# Patient Record
Sex: MALE | Race: WHITE | NOT HISPANIC OR LATINO | Employment: FULL TIME | URBAN - METROPOLITAN AREA
[De-identification: names, ages, dates, MRNs, and addresses within clinical notes are randomized per-mention and may not be internally consistent; named-entity substitution may affect disease eponyms.]

---

## 2023-09-06 ENCOUNTER — MEDICAL CORRESPONDENCE (OUTPATIENT)
Dept: HEALTH INFORMATION MANAGEMENT | Facility: CLINIC | Age: 43
End: 2023-09-06

## 2023-09-06 ENCOUNTER — TRANSCRIBE ORDERS (OUTPATIENT)
Dept: OTHER | Age: 43
End: 2023-09-06

## 2023-09-06 DIAGNOSIS — M25.511 RIGHT SHOULDER PAIN, UNSPECIFIED CHRONICITY: Primary | ICD-10-CM

## 2023-09-13 NOTE — TELEPHONE ENCOUNTER
Action    Action Taken Per patient, had some kind of surgery more than 10-15 years ago in [place], [place] on RT shoulder. Not a replacement, but a repair of something. Not sure where or when exactly. Not sure what, just knows he was opened up. Nothing in [place]. Has not been seen since. States over a month ago, pain started. Has trouble sleeping and getting comfortable.        DIAGNOSIS: Right shoulder pain   APPOINTMENT DATE: 09/14/2023   NOTES STATUS DETAILS   OFFICE NOTE from referring provider N/A    OFFICE NOTE from other specialist N/A    DISCHARGE SUMMARY from hospital N/A    DISCHARGE REPORT from the ER N/A    OPERATIVE REPORT N/A    MEDICATION LIST N/A    EMG (for Spine) N/A    IMPLANT RECORD/STICKER N/A    LABS     CBC/DIFF N/A    CULTURES N/A    INJECTIONS DONE IN RADIOLOGY N/A    MRI N/A    CT SCAN N/A    XRAYS (IMAGES & REPORTS) N/A    TUMOR     PATHOLOGY  Slides & report N/A

## 2023-09-14 ENCOUNTER — PRE VISIT (OUTPATIENT)
Dept: ORTHOPEDICS | Facility: CLINIC | Age: 43
End: 2023-09-14
Payer: COMMERCIAL

## 2023-09-14 ENCOUNTER — ANCILLARY PROCEDURE (OUTPATIENT)
Dept: GENERAL RADIOLOGY | Facility: CLINIC | Age: 43
End: 2023-09-14
Attending: ORTHOPAEDIC SURGERY
Payer: COMMERCIAL

## 2023-09-14 ENCOUNTER — OFFICE VISIT (OUTPATIENT)
Dept: ORTHOPEDICS | Facility: CLINIC | Age: 43
End: 2023-09-14
Payer: COMMERCIAL

## 2023-09-14 DIAGNOSIS — M25.511 RIGHT SHOULDER PAIN, UNSPECIFIED CHRONICITY: ICD-10-CM

## 2023-09-14 DIAGNOSIS — M25.511 RIGHT SHOULDER PAIN: Primary | ICD-10-CM

## 2023-09-14 DIAGNOSIS — M19.011 ARTHRITIS OF RIGHT GLENOHUMERAL JOINT: Primary | ICD-10-CM

## 2023-09-14 DIAGNOSIS — M25.511 RIGHT SHOULDER PAIN: ICD-10-CM

## 2023-09-14 PROCEDURE — 73030 X-RAY EXAM OF SHOULDER: CPT | Mod: RT | Performed by: RADIOLOGY

## 2023-09-14 PROCEDURE — 99204 OFFICE O/P NEW MOD 45 MIN: CPT | Performed by: ORTHOPAEDIC SURGERY

## 2023-09-14 RX ORDER — ATOMOXETINE 100 MG/1
1 CAPSULE ORAL DAILY
COMMUNITY
Start: 2022-10-27 | End: 2023-12-12

## 2023-09-14 RX ORDER — VITAMIN B COMPLEX
1000 TABLET ORAL
COMMUNITY
Start: 2023-09-06

## 2023-09-14 RX ORDER — LISINOPRIL/HYDROCHLOROTHIAZIDE 10-12.5 MG
1 TABLET ORAL EVERY MORNING
COMMUNITY
Start: 2021-11-01

## 2023-09-14 RX ORDER — LANOLIN ALCOHOL/MO/W.PET/CERES
1000 CREAM (GRAM) TOPICAL
COMMUNITY
Start: 2023-07-03

## 2023-09-14 RX ORDER — DEXTROAMPHETAMINE SACCHARATE, AMPHETAMINE ASPARTATE MONOHYDRATE, DEXTROAMPHETAMINE SULFATE AND AMPHETAMINE SULFATE 7.5; 7.5; 7.5; 7.5 MG/1; MG/1; MG/1; MG/1
60 CAPSULE, EXTENDED RELEASE ORAL
COMMUNITY
Start: 2023-08-02 | End: 2023-10-13

## 2023-09-14 NOTE — PROGRESS NOTES
CHIEF COMPLAINT: Right shoulder pain    DIAGNOSIS: Right shoulder osteoarthritis     OCCUPATION/SPORT: [employer]     HPI:   Jairo Brown is a very pleasant 42 year old, Right-hand dominant male who presents for evaluation of right shoulder pain.  Symptoms started in August of 2023. There was no a precipitating event. The pain is located to the lateral portion of right shoulder. Worst pain is rated a 7-8 of 10, and current pain is rated at 5-6 of 10. Symptoms are worsened by sleeping, raising arm up, and driving. Symptoms are improved with massage. Patient has tried aleve and massage with mild relief. Patient has no radiating down the arm, notices some numbness. Notably, the patient has had right shoulder RC repair 20+ years ago and left shoulder hemiarthroplasty 2 years ago with Dr. Mitchell at [place]. No other concerns or complaints at this time.  SANE score R - 100 with pain L - 100    PAST MEDICAL HISTORY:  No past medical history on file.    PAST SURGICAL HISTORY:  No past surgical history on file.    CURRENT MEDICATIONS:  No current outpatient medications on file.       ALLERGIES:    Not on File      FAMILY HISTORY: No pertinent family history, reviewed in EMR.    SOCIAL HISTORY:   Social History     Socioeconomic History    Marital status:      Spouse name: Not on file    Number of children: Not on file    Years of education: Not on file    Highest education level: Not on file   Occupational History    Not on file   Tobacco Use    Smoking status: Not on file    Smokeless tobacco: Not on file   Substance and Sexual Activity    Alcohol use: Not on file    Drug use: Not on file    Sexual activity: Not on file   Other Topics Concern    Not on file   Social History Narrative    Not on file     Social Determinants of Health     Financial Resource Strain: Not on file   Food Insecurity: Not on file   Transportation Needs: Not on file   Physical Activity: Not on file   Stress: Not on file   Social  Connections: Not on file   Intimate Partner Violence: Not on file   Housing Stability: Not on file       REVIEW OF SYSTEMS: Positive for that noted in past medical history and history of present illness and otherwise reviewed in EMR    PHYSICAL EXAM:  Patient is Data Unavailable and weighs 0 lbs 0 oz There were no vitals taken for this visit.  There is no height or weight on file to calculate BMI.   Constitutional: Well-developed, well-nourished, healthy appearing male.  Skin: Warm, dry   HEENT: Normal  Cardiac: Well perfused extremities, strong 2+ peripheral pulses. No edema.   Pulmonary: Breathing room air    Musculoskeletal:   Right Shoulder:  Prior anterior based incision is healed  AROM right shoulder: 160/160/60/L1   AROM left shoulder: 160/160/60/L1   5-/5 supraspinatus, 5-/5 infraspinatus, 5/5 subscapularis  no AC joint pain, negative cross body adduction  Neurovascular exam and cervical spine exam are normal.    X-RAYS:   I personally reviewed the prior x-rays which demonstrate 2 metal anchors from prior rotator cuff repair, there is multiple loose bodies including subcoracoid space, there is significant glenohumeral joint space narrowing, a B2 glenoid with posterior glenoid bone loss.    ADVANCED IMAGING:     IMPRESSION: 42 year old-year-old right hand dominant male, with right shoulder osteoarthritis.     PLAN:     I discussed with the patient the etiology of their condition. We discussed at length the options as noted above.  We do lengthy discussion today.  The patient has a very complex medical problem including osteoarthritis of the right shoulder with multiple loose bodies, and the history of a open rotator cuff repair 20 years ago.  Patient already has some significant glenoid wear posteriorly.  We talked about options of conservative versus surgical intervention.  Conservatively we discussed activity modification, avoidance of shoulder loading exercises, anti-inflammatories as needed, consideration  of ultrasound-guided cortisone injection to the glenohumeral joint.  We discussed that surgically the patient would need to consider a joint replacement, we discussed that a hemiarthroplasty would continue to wear on the glenoid and especially given the posterior glenoid bone loss may lead to the point that the glenoid is not reconstructable.  Additionally we discussed the need to assess the rotator cuff given the history of the old rotator cuff repair, potential for a total versus reverse shoulder arthroplasty.  We discussed that a reverse shoulder arthroplasty would have a high chance of failure given the patient's young age and demanding job.  We discussed that the patient is going to be very high risk for failure of any arthroplasty especially given that the age of 42 then likely need for revisions in the patient's lifetime and that at some point the patient may have an irreconstructable shoulder.  Patient notes a fear of needles and a high pain tolerance and does not wish to try cortisone injections.  He is more interested in arthroplasty given a hemiarthroplasty on the left shoulder as well as a total knee replacement.  I again reemphasized the high chances of need for revision in the patient's lifetime, discussed the potential for significant degeneration, discussed conservative measures given the patient has not tried any so far.  Patient is going to consider this.  We will follow-up with [facility] for cortisone injection if interested.    At the conclusion of the office visit, Jairo verbally acknowledged that I answered all of his questions satisfactorily.    Zumla Davis MD  Orthopedic Surgery Sports Medicine and Shoulder Surgery

## 2023-09-14 NOTE — LETTER
9/14/2023         RE: Jairo Brown  [address]  [place] [address]        Dear Colleague,    Thank you for referring your patient, Jairo Brown, to the [facility]. Please see a copy of my visit note below.    CHIEF COMPLAINT: Right shoulder pain    DIAGNOSIS: Right shoulder osteoarthritis     OCCUPATION/SPORT: [employer]     HPI:   Jairo Brown is a very pleasant 42 year old, Right-hand dominant male who presents for evaluation of right shoulder pain.  Symptoms started in August of 2023. There was no a precipitating event. The pain is located to the lateral portion of right shoulder. Worst pain is rated a 7-8 of 10, and current pain is rated at 5-6 of 10. Symptoms are worsened by sleeping, raising arm up, and driving. Symptoms are improved with massage. Patient has tried aleve and massage with mild relief. Patient has no radiating down the arm, notices some numbness. Notably, the patient has had right shoulder RC repair 20+ years ago and left shoulder hemiarthroplasty 2 years ago with Dr. Mitchell at [place]. No other concerns or complaints at this time.  SANE score R - 100 with pain L - 100    PAST MEDICAL HISTORY:  No past medical history on file.    PAST SURGICAL HISTORY:  No past surgical history on file.    CURRENT MEDICATIONS:  No current outpatient medications on file.       ALLERGIES:    Not on File      FAMILY HISTORY: No pertinent family history, reviewed in EMR.    SOCIAL HISTORY:   Social History     Socioeconomic History     Marital status:      Spouse name: Not on file     Number of children: Not on file     Years of education: Not on file     Highest education level: Not on file   Occupational History     Not on file   Tobacco Use     Smoking status: Not on file     Smokeless tobacco: Not on file   Substance and Sexual Activity     Alcohol use: Not on file     Drug use: Not on file     Sexual activity: Not on file   Other  Topics Concern     Not on file   Social History Narrative     Not on file     Social Determinants of Health     Financial Resource Strain: Not on file   Food Insecurity: Not on file   Transportation Needs: Not on file   Physical Activity: Not on file   Stress: Not on file   Social Connections: Not on file   Intimate Partner Violence: Not on file   Housing Stability: Not on file       REVIEW OF SYSTEMS: Positive for that noted in past medical history and history of present illness and otherwise reviewed in EMR    PHYSICAL EXAM:  Patient is Data Unavailable and weighs 0 lbs 0 oz There were no vitals taken for this visit.  There is no height or weight on file to calculate BMI.   Constitutional: Well-developed, well-nourished, healthy appearing male.  Skin: Warm, dry   HEENT: Normal  Cardiac: Well perfused extremities, strong 2+ peripheral pulses. No edema.   Pulmonary: Breathing room air    Musculoskeletal:   Right Shoulder:  Prior anterior based incision is healed  AROM right shoulder: 160/160/60/L1   AROM left shoulder: 160/160/60/L1   5-/5 supraspinatus, 5-/5 infraspinatus, 5/5 subscapularis  no AC joint pain, negative cross body adduction  Neurovascular exam and cervical spine exam are normal.    X-RAYS:   I personally reviewed the prior x-rays which demonstrate 2 metal anchors from prior rotator cuff repair, there is multiple loose bodies including subcoracoid space, there is significant glenohumeral joint space narrowing, a B2 glenoid with posterior glenoid bone loss.    ADVANCED IMAGING:     IMPRESSION: 42 year old-year-old right hand dominant male, with right shoulder osteoarthritis.     PLAN:     I discussed with the patient the etiology of their condition. We discussed at length the options as noted above.  We do lengthy discussion today.  The patient has a very complex medical problem including osteoarthritis of the right shoulder with multiple loose bodies, and the history of a open rotator cuff repair 20  years ago.  Patient already has some significant glenoid wear posteriorly.  We talked about options of conservative versus surgical intervention.  Conservatively we discussed activity modification, avoidance of shoulder loading exercises, anti-inflammatories as needed, consideration of ultrasound-guided cortisone injection to the glenohumeral joint.  We discussed that surgically the patient would need to consider a joint replacement, we discussed that a hemiarthroplasty would continue to wear on the glenoid and especially given the posterior glenoid bone loss may lead to the point that the glenoid is not reconstructable.  Additionally we discussed the need to assess the rotator cuff given the history of the old rotator cuff repair, potential for a total versus reverse shoulder arthroplasty.  We discussed that a reverse shoulder arthroplasty would have a high chance of failure given the patient's young age and demanding job.  We discussed that the patient is going to be very high risk for failure of any arthroplasty especially given that the age of 42 then likely need for revisions in the patient's lifetime and that at some point the patient may have an irreconstructable shoulder.  Patient notes a fear of needles and a high pain tolerance and does not wish to try cortisone injections.  He is more interested in arthroplasty given a hemiarthroplasty on the left shoulder as well as a total knee replacement.  I again reemphasized the high chances of need for revision in the patient's lifetime, discussed the potential for significant degeneration, discussed conservative measures given the patient has not tried any so far.  Patient is going to consider this.  We will follow-up with [facility] for cortisone injection if interested.    At the conclusion of the office visit, Jairo verbally acknowledged that I answered all of his questions satisfactorily.    Zulma Davis MD  Orthopedic Surgery Sports Medicine  and Shoulder Surgery      Again, thank you for allowing me to participate in the care of your patient.        Sincerely,        MARIAH YAN MD